# Patient Record
Sex: FEMALE | Race: WHITE | Employment: STUDENT | URBAN - NONMETROPOLITAN AREA
[De-identification: names, ages, dates, MRNs, and addresses within clinical notes are randomized per-mention and may not be internally consistent; named-entity substitution may affect disease eponyms.]

---
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
## 2025-05-21 ENCOUNTER — TELEPHONE (OUTPATIENT)
Dept: FAMILY MEDICINE CLINIC | Age: 20
End: 2025-05-21

## 2025-05-21 NOTE — TELEPHONE ENCOUNTER
----- Message from Andra CHEEMA sent at 5/21/2025  1:57 PM EDT -----  Regarding: [facility] Appointment Request  [facility] Appointment Request    Patient needs appointment for [facility] Appointment Type: New to Provider/Establish care    Patient Requested Dates(s):  June 9, 2025  Patient Requested Time:  Around 10AM   Provider Name:  Zander Robb DO    Reason for Appointment Request: Established Patient - No appointments available during search  --------------------------------------------------------------------------------------------------------------------------    Relationship to Patient: Guardian - Kunesh, Corrinne - mother     Call Back Information: OK to leave message on voicemail  Preferred Call Back Number: [phone]

## 2025-06-09 ENCOUNTER — OFFICE VISIT (OUTPATIENT)
Dept: FAMILY MEDICINE CLINIC | Age: 20
End: 2025-06-09
Payer: COMMERCIAL

## 2025-06-09 VITALS
HEART RATE: 70 BPM | DIASTOLIC BLOOD PRESSURE: 78 MMHG | BODY MASS INDEX: 28.07 KG/M2 | RESPIRATION RATE: 14 BRPM | WEIGHT: 158.4 LBS | OXYGEN SATURATION: 99 % | HEIGHT: 63 IN | TEMPERATURE: 98.8 F | SYSTOLIC BLOOD PRESSURE: 126 MMHG

## 2025-06-09 DIAGNOSIS — M25.562 ACUTE PAIN OF LEFT KNEE: ICD-10-CM

## 2025-06-09 DIAGNOSIS — F90.0 ATTENTION DEFICIT HYPERACTIVITY DISORDER (ADHD), PREDOMINANTLY INATTENTIVE TYPE: Primary | ICD-10-CM

## 2025-06-09 DIAGNOSIS — R14.0 BLOATING: ICD-10-CM

## 2025-06-09 LAB
25(OH)D3 SERPL-MCNC: 45 NG/ML (ref 30–100)
ALBUMIN SERPL BCG-MCNC: 4.6 G/DL (ref 3.4–4.9)
ALP SERPL-CCNC: 61 U/L (ref 38–126)
ALT SERPL W/O P-5'-P-CCNC: 15 U/L (ref 10–35)
ANION GAP SERPL CALC-SCNC: 11 MEQ/L (ref 8–16)
AST SERPL-CCNC: 22 U/L (ref 10–35)
BILIRUB SERPL-MCNC: 0.4 MG/DL (ref 0.3–1.2)
BUN SERPL-MCNC: 13 MG/DL (ref 8–23)
CALCIUM SERPL-MCNC: 9.5 MG/DL (ref 8.6–10)
CHLORIDE SERPL-SCNC: 106 MEQ/L (ref 98–111)
CO2 SERPL-SCNC: 24 MEQ/L (ref 22–29)
CREAT SERPL-MCNC: 1 MG/DL (ref 0.5–0.9)
CRP SERPL-MCNC: < 0.3 MG/DL (ref 0–0.5)
DEPRECATED RDW RBC AUTO: 41.1 FL (ref 35–45)
ERYTHROCYTE [DISTWIDTH] IN BLOOD BY AUTOMATED COUNT: 11.9 % (ref 11.5–14.5)
FOLATE SERPL-MCNC: 10.9 NG/ML (ref 4.6–34.8)
GFR SERPL CREATININE-BSD FRML MDRD: 83 ML/MIN/1.73M2
GLUCOSE SERPL-MCNC: 97 MG/DL (ref 74–109)
HCT VFR BLD AUTO: 43.1 % (ref 37–47)
HGB BLD-MCNC: 14.2 GM/DL (ref 12–16)
MCH RBC QN AUTO: 30.9 PG (ref 26–33)
MCHC RBC AUTO-ENTMCNC: 32.9 GM/DL (ref 32.2–35.5)
MCV RBC AUTO: 93.7 FL (ref 81–99)
PLATELET # BLD AUTO: 277 THOU/MM3 (ref 130–400)
PMV BLD AUTO: 10.5 FL (ref 9.4–12.4)
POTASSIUM SERPL-SCNC: 4.1 MEQ/L (ref 3.5–5.2)
PROT SERPL-MCNC: 7.5 G/DL (ref 6.4–8.3)
RBC # BLD AUTO: 4.6 MILL/MM3 (ref 4.2–5.4)
SODIUM SERPL-SCNC: 141 MEQ/L (ref 135–145)
TSH SERPL DL<=0.05 MIU/L-ACNC: 1.88 UIU/ML (ref 0.27–4.2)
VIT B12 SERPL-MCNC: 712 PG/ML (ref 232–1245)
WBC # BLD AUTO: 7.6 THOU/MM3 (ref 4.8–10.8)

## 2025-06-09 PROCEDURE — 99204 OFFICE O/P NEW MOD 45 MIN: CPT | Performed by: STUDENT IN AN ORGANIZED HEALTH CARE EDUCATION/TRAINING PROGRAM

## 2025-06-09 RX ORDER — METHYLPHENIDATE HYDROCHLORIDE 18 MG/1
18 TABLET ORAL DAILY
Qty: 30 TABLET | Refills: 0 | Status: SHIPPED | OUTPATIENT
Start: 2025-06-09 | End: 2025-07-09

## 2025-06-09 SDOH — ECONOMIC STABILITY: FOOD INSECURITY: WITHIN THE PAST 12 MONTHS, YOU WORRIED THAT YOUR FOOD WOULD RUN OUT BEFORE YOU GOT MONEY TO BUY MORE.: NEVER TRUE

## 2025-06-09 SDOH — ECONOMIC STABILITY: FOOD INSECURITY: WITHIN THE PAST 12 MONTHS, THE FOOD YOU BOUGHT JUST DIDN'T LAST AND YOU DIDN'T HAVE MONEY TO GET MORE.: NEVER TRUE

## 2025-06-09 ASSESSMENT — ENCOUNTER SYMPTOMS
ABDOMINAL PAIN: 1
TROUBLE SWALLOWING: 0
CONSTIPATION: 0
VOMITING: 0
COUGH: 0
EYE PAIN: 0
BLOOD IN STOOL: 0
DIARRHEA: 0
NAUSEA: 0
SHORTNESS OF BREATH: 0

## 2025-06-09 ASSESSMENT — PATIENT HEALTH QUESTIONNAIRE - PHQ9
SUM OF ALL RESPONSES TO PHQ QUESTIONS 1-9: 0
1. LITTLE INTEREST OR PLEASURE IN DOING THINGS: NOT AT ALL
SUM OF ALL RESPONSES TO PHQ QUESTIONS 1-9: 0
2. FEELING DOWN, DEPRESSED OR HOPELESS: NOT AT ALL

## 2025-06-09 NOTE — PROGRESS NOTES
Venipuncture obtained from  right arm. Patient tolerated the procedure without complications or complaints.   
Normal breath sounds.   Musculoskeletal:      Cervical back: Normal range of motion.   Skin:     General: Skin is warm and dry.      Findings: No erythema or rash.   Neurological:      Mental Status: She is alert and oriented to person, place, and time.      Cranial Nerves: No cranial nerve deficit.   Psychiatric:         Behavior: Behavior normal.         Thought Content: Thought content normal.         Judgment: Judgment normal.         Results           Assessment/Plan:     Assessment & Plan  1. Blood circulation issues.  Her blood pressure readings are within the normal range, indicating effective cardiac function. The possibility of Raynaud's phenomenon was discussed, but it is unlikely given her symptoms. A comprehensive blood workup will be conducted to assess thyroid function, complete blood count, kidney function, liver function, electrolyte levels, celiac disease, CRP, vitamin D, B12, and folate levels.    2. Attention deficit hyperactivity disorder (ADHD).  The potential side effects of Concerta, including insomnia and decreased appetite, were discussed. A prescription for Concerta 18 mg has been provided. If Concerta is not effective or causes intolerable side effects, non-stimulant options will be considered.    3. Bloating.  She is currently on omeprazole 20 mg once daily. The possibility of irritable bowel syndrome (IBS), lactose intolerance, and celiac disease was discussed. A celiac panel will be included in her blood workup. She is advised to maintain a food diary to track her diet and symptoms. If symptoms persist or worsen, a referral to a gastroenterologist will be considered.    4. Acne.  She has been using clindamycin gel, which appears to be effective. The option of BenzaClin was discussed as a potential next step if needed. She is advised to continue with her current regimen.    5. Knee pain.  The pain is likely due to a meniscus issue rather than a ligament injury. She is advised to rest,

## 2025-06-11 ENCOUNTER — RESULTS FOLLOW-UP (OUTPATIENT)
Dept: FAMILY MEDICINE CLINIC | Age: 20
End: 2025-06-11

## 2025-06-12 LAB
GLIADIN PEPTIDE IGA SER IA-ACNC: < 0.72 FLU (ref 0–4.99)
IGA SERPL-MCNC: 239 MG/DL (ref 68–408)
TTG IGA SER IA-ACNC: < 1.02 FLU (ref 0–4.99)

## 2025-06-25 ENCOUNTER — PATIENT MESSAGE (OUTPATIENT)
Dept: FAMILY MEDICINE CLINIC | Age: 20
End: 2025-06-25

## 2025-06-26 RX ORDER — TRIAMCINOLONE ACETONIDE 1 MG/G
CREAM TOPICAL 2 TIMES DAILY
Qty: 80 G | Refills: 1 | Status: SHIPPED | OUTPATIENT
Start: 2025-06-26

## 2025-07-10 DIAGNOSIS — F90.0 ATTENTION DEFICIT HYPERACTIVITY DISORDER (ADHD), PREDOMINANTLY INATTENTIVE TYPE: ICD-10-CM

## 2025-07-10 RX ORDER — METHYLPHENIDATE HYDROCHLORIDE 18 MG/1
18 TABLET ORAL DAILY
Qty: 30 TABLET | Refills: 0 | Status: SHIPPED | OUTPATIENT
Start: 2025-07-10 | End: 2025-08-09

## 2025-07-10 NOTE — TELEPHONE ENCOUNTER
This medication refill is regarding a electronic request.  Refill requested by patient.    Requested Prescriptions     Pending Prescriptions Disp Refills    methylphenidate (CONCERTA) 18 MG extended release tablet 30 tablet 0     Sig: Take 1 tablet by mouth daily for 30 days. Max Daily Amount: 18 mg       Date of last visit: 6/9/2025  Date of next visit: 9/3/2025  Pharmacy Name: [facility]

## 2025-08-09 DIAGNOSIS — F90.0 ATTENTION DEFICIT HYPERACTIVITY DISORDER (ADHD), PREDOMINANTLY INATTENTIVE TYPE: ICD-10-CM

## 2025-08-11 RX ORDER — METHYLPHENIDATE HYDROCHLORIDE 18 MG/1
18 TABLET ORAL DAILY
Qty: 30 TABLET | Refills: 0 | Status: SHIPPED | OUTPATIENT
Start: 2025-08-11 | End: 2025-09-10